# Patient Record
Sex: FEMALE | URBAN - METROPOLITAN AREA
[De-identification: names, ages, dates, MRNs, and addresses within clinical notes are randomized per-mention and may not be internally consistent; named-entity substitution may affect disease eponyms.]

---

## 2022-09-14 ENCOUNTER — APPOINTMENT (RX ONLY)
Dept: URBAN - METROPOLITAN AREA CLINIC 176 | Facility: CLINIC | Age: 37
Setting detail: DERMATOLOGY
End: 2022-09-14

## 2022-09-14 DIAGNOSIS — Z41.9 ENCOUNTER FOR PROCEDURE FOR PURPOSES OTHER THAN REMEDYING HEALTH STATE, UNSPECIFIED: ICD-10-CM

## 2022-09-14 PROCEDURE — ? JEUVEAU

## 2022-09-14 NOTE — PROCEDURE: JEUVEAU
Consent: Written consent obtained. Risks include but not limited to lid/brow ptosis, bruising, swelling, diplopia, temporary effect, incomplete chemical denervation.
Additional Area 3 Location: Gummy smile
Show Ucl Units: No
Nasal Root Units: 0
Show Levator Superior Units: Yes
Post-Care Instructions: Patient instructed to not lie down for 4 hours and limit physical activity for 24 hours.
Additional Area 4 Location: axillae
Forehead Units: 15
Lot #: [id number]
Glabellar Complex Units: 10
Dilution (U/0.1 Cc): 5
Additional Area 1 Location: orbicularis oris
Detail Level: Detailed
Expiration Date (Month Year): 11/2024
Additional Area 2 Location: Chin/mentalis
Price (Use Numbers Only, No Special Characters Or $): 320